# Patient Record
Sex: MALE | Race: NATIVE HAWAIIAN OR OTHER PACIFIC ISLANDER | HISPANIC OR LATINO | Employment: FULL TIME | URBAN - METROPOLITAN AREA
[De-identification: names, ages, dates, MRNs, and addresses within clinical notes are randomized per-mention and may not be internally consistent; named-entity substitution may affect disease eponyms.]

---

## 2024-04-25 ENCOUNTER — HOSPITAL ENCOUNTER (EMERGENCY)
Facility: HOSPITAL | Age: 34
Discharge: OTHER INSTITUTION | End: 2024-04-25
Attending: EMERGENCY MEDICINE
Payer: OTHER MISCELLANEOUS

## 2024-04-25 ENCOUNTER — APPOINTMENT (OUTPATIENT)
Dept: GENERAL RADIOLOGY | Facility: HOSPITAL | Age: 34
End: 2024-04-25
Payer: OTHER MISCELLANEOUS

## 2024-04-25 VITALS
OXYGEN SATURATION: 100 % | HEIGHT: 61 IN | RESPIRATION RATE: 18 BRPM | TEMPERATURE: 97.7 F | BODY MASS INDEX: 32.34 KG/M2 | WEIGHT: 171.3 LBS | HEART RATE: 56 BPM | SYSTOLIC BLOOD PRESSURE: 121 MMHG | DIASTOLIC BLOOD PRESSURE: 82 MMHG

## 2024-04-25 DIAGNOSIS — M79.644 THUMB PAIN, RIGHT: ICD-10-CM

## 2024-04-25 DIAGNOSIS — S61.111A LACERATION OF RIGHT THUMB WITHOUT FOREIGN BODY WITH DAMAGE TO NAIL, INITIAL ENCOUNTER: Primary | ICD-10-CM

## 2024-04-25 PROCEDURE — 90715 TDAP VACCINE 7 YRS/> IM: CPT

## 2024-04-25 PROCEDURE — 73140 X-RAY EXAM OF FINGER(S): CPT

## 2024-04-25 PROCEDURE — 25010000002 LIDOCAINE 1 % SOLUTION

## 2024-04-25 PROCEDURE — 90471 IMMUNIZATION ADMIN: CPT

## 2024-04-25 PROCEDURE — 99285 EMERGENCY DEPT VISIT HI MDM: CPT

## 2024-04-25 PROCEDURE — 25010000002 TETANUS-DIPHTH-ACELL PERTUSSIS 5-2.5-18.5 LF-MCG/0.5 SUSPENSION PREFILLED SYRINGE

## 2024-04-25 RX ORDER — GINSENG 100 MG
1 CAPSULE ORAL ONCE
Status: COMPLETED | OUTPATIENT
Start: 2024-04-25 | End: 2024-04-25

## 2024-04-25 RX ORDER — LIDOCAINE HYDROCHLORIDE 10 MG/ML
10 INJECTION, SOLUTION INFILTRATION; PERINEURAL ONCE
Status: COMPLETED | OUTPATIENT
Start: 2024-04-25 | End: 2024-04-25

## 2024-04-25 RX ORDER — LIDOCAINE 40 MG/G
1 CREAM TOPICAL ONCE
Status: COMPLETED | OUTPATIENT
Start: 2024-04-25 | End: 2024-04-25

## 2024-04-25 RX ORDER — LIDOCAINE 5 G/100G
1 CREAM RECTAL; TOPICAL ONCE
Status: DISCONTINUED | OUTPATIENT
Start: 2024-04-25 | End: 2024-04-25

## 2024-04-25 RX ORDER — HYDROCODONE BITARTRATE AND ACETAMINOPHEN 7.5; 325 MG/1; MG/1
1 TABLET ORAL ONCE
Status: COMPLETED | OUTPATIENT
Start: 2024-04-25 | End: 2024-04-25

## 2024-04-25 RX ADMIN — LIDOCAINE HYDROCHLORIDE 10 ML: 10 INJECTION, SOLUTION INFILTRATION; PERINEURAL at 13:47

## 2024-04-25 RX ADMIN — HYDROCODONE BITARTRATE AND ACETAMINOPHEN 1 TABLET: 7.5; 325 TABLET ORAL at 12:06

## 2024-04-25 RX ADMIN — LIDOCAINE 4% 1 APPLICATION: 4 CREAM TOPICAL at 13:46

## 2024-04-25 RX ADMIN — TETANUS TOXOID, REDUCED DIPHTHERIA TOXOID AND ACELLULAR PERTUSSIS VACCINE, ADSORBED 0.5 ML: 5; 2.5; 8; 8; 2.5 SUSPENSION INTRAMUSCULAR at 13:47

## 2024-04-25 RX ADMIN — BACITRACIN 0.9 G: 500 OINTMENT TOPICAL at 15:07

## 2024-04-25 NOTE — ED PROVIDER NOTES
"Time: 11:51 AM EDT  Date of encounter:  4/25/2024  Room number: 54/54  Independent Historian/Clinical History and Information was obtained by:   Patient    History is limited by: N/A    Chief Complaint: thumb laceration       History of Present Illness:  Patient is a 33 y.o. year old male who presents to the emergency department for right thumb laceration that he sustained while at work. 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     HPI    Patient Care Team  Primary Care Provider: Provider, No Known    Past Medical History:     No Known Allergies  History reviewed. No pertinent past medical history.  History reviewed. No pertinent surgical history.  History reviewed. No pertinent family history.    Home Medications:  Prior to Admission medications    Not on File        Social History:          Review of Systems:  Review of Systems   Constitutional:  Negative for chills and fever.   HENT:  Negative for congestion, ear pain and sore throat.    Eyes:  Negative for pain.   Respiratory:  Negative for cough, chest tightness and shortness of breath.    Cardiovascular:  Negative for chest pain.   Gastrointestinal:  Negative for abdominal pain, diarrhea, nausea and vomiting.   Genitourinary:  Negative for flank pain and hematuria.   Musculoskeletal:  Negative for joint swelling.   Skin:  Positive for wound (right thumb laceration). Negative for pallor.   Neurological:  Negative for seizures and headaches.   All other systems reviewed and are negative.       Physical Exam:  /82 (BP Location: Left arm, Patient Position: Sitting)   Pulse 56   Temp 97.7 °F (36.5 °C) (Oral)   Resp 18   Ht 154.9 cm (61\")   Wt 77.7 kg (171 lb 4.8 oz)   SpO2 100%   BMI 32.37 kg/m²     Physical Exam  Vitals and nursing note reviewed.   Constitutional:       General: He is not in acute distress.     Appearance: Normal appearance. He is not ill-appearing, toxic-appearing or diaphoretic.   HENT:      Head: " Normocephalic and atraumatic.      Mouth/Throat:      Mouth: Mucous membranes are moist.   Eyes:      General: No scleral icterus.  Cardiovascular:      Rate and Rhythm: Normal rate and regular rhythm.      Pulses: Normal pulses.      Heart sounds: Normal heart sounds.   Pulmonary:      Effort: Pulmonary effort is normal. No respiratory distress.      Breath sounds: Normal breath sounds. No stridor. No wheezing.   Abdominal:      General: Abdomen is flat.      Palpations: Abdomen is soft.      Tenderness: There is no abdominal tenderness.   Musculoskeletal:         General: Tenderness and signs of injury present. No swelling or deformity. Normal range of motion.      Cervical back: Normal range of motion and neck supple. No tenderness.      Comments: There are 2 small parallel lacerations on the tip of right thumb.  There is also nailbed involvement.  Please see images.  There is good cap refill to digit PMS intact.     Skin:     General: Skin is warm and dry.      Capillary Refill: Capillary refill takes less than 2 seconds.   Neurological:      Mental Status: He is alert and oriented to person, place, and time. Mental status is at baseline.      Sensory: No sensory deficit.   Psychiatric:         Mood and Affect: Mood normal.         Behavior: Behavior normal.         Thought Content: Thought content normal.         Judgment: Judgment normal.                                Procedures:  Laceration Repair    Date/Time: 4/25/2024 2:54 PM    Performed by: Leah Horner APRN  Authorized by: Edgar Chambers DO    Consent:     Consent obtained:  Verbal    Consent given by:  Patient    Risks, benefits, and alternatives were discussed: yes      Risks discussed:  Infection, pain, poor cosmetic result and poor wound healing  Universal protocol:     Procedure explained and questions answered to patient or proxy's satisfaction: yes      Patient identity confirmed:  Verbally with patient  Anesthesia:     Anesthesia  method:  Topical application and local infiltration    Topical anesthetic:  Lidocaine gel    Local anesthetic:  Lidocaine 1% WITH epi  Laceration details:     Location:  Finger    Finger location:  R thumb    Wound length (cm): 2 parallel cuts to the tip, each measuring 0.5 cm, nail laceration approximately 2.5 cm.  Pre-procedure details:     Preparation:  Patient was prepped and draped in usual sterile fashion  Exploration:     Limited defect created (wound extended): no      Hemostasis achieved with:  Direct pressure    Imaging outcome: foreign body not noted    Treatment:     Area cleansed with:  Povidone-iodine and saline    Amount of cleaning:  Standard    Irrigation solution:  Sterile saline    Irrigation method:  Syringe  Skin repair:     Repair method:  Sutures    Suture size:  4-0    Suture material:  Nylon    Number of sutures:  4  Approximation:     Approximation:  Close  Repair type:     Repair type:  Simple  Post-procedure details:     Dressing:  Antibiotic ointment and non-adherent dressing    Procedure completion:  Tolerated well, no immediate complications        Medical Decision Making:      Comorbidities that affect care:    None    External Notes reviewed:    NoneI have attempted review patient's previous medical encounters however there are none available within the epic charting system.        The following orders were placed and all results were independently analyzed by me:  Orders Placed This Encounter   Procedures    Laceration Repair    XR Finger 2+ View Right    Obtain & Apply The Following- Upper extremity; Finger splint       Medications Given in the Emergency Department:  Medications   HYDROcodone-acetaminophen (NORCO) 7.5-325 MG per tablet 1 tablet (1 tablet Oral Given 4/25/24 1206)   lidocaine (XYLOCAINE) 1 % injection 10 mL (10 mL Infiltration Given 4/25/24 1347)   Tetanus-Diphth-Acell Pertussis (BOOSTRIX) injection 0.5 mL (0.5 mL Intramuscular Given 4/25/24 1347)   lidocaine (LMX) 4  % cream 1 Application (1 Application Topical Given 4/25/24 1346)   bacitracin 500 UNIT/GM ointment 0.9 g (0.9 g Topical Given 4/25/24 1507)        ED Course:    ED Course as of 04/25/24 1829   Thu Apr 25, 2024   1324 Patient continues to wait for numbing medication to be applied.  Nursing staff was consulted about status application. [MS]   1456 Orders placed for hand consult at this time. XRs requested to be power shared.   Pt's is agreeable for hand center to call his employer to schedule follow up if needed.   Pt's employer/friend is [employer]. He can be reached at [phone] [MS]   8438 Due to patient becoming nauseated and lightheaded with norco, he is requesting a less potent pain medication to be prescribed.  [MS]   1512 Spoke with on-call nurse at [facility], Minda, she was also provided pictures of patient's injury.  She advises she will call me back and let me know about follow-up and if patient needs to present to the local tonight. [MS]      ED Course User Index  [MS] Leah Horner, APRN       Labs:    Lab Results (last 24 hours)       ** No results found for the last 24 hours. **             Imaging:    XR Finger 2+ View Right    Result Date: 4/25/2024  XR FINGER 2+ VW RIGHT-  Date of Exam: 4/25/2024 12:05 PM  Indication: finger injury  Comparison: None available.  Findings: No acute fracture is identified. No focal osseous abnormality is identified. The joints appear congruent. There is a soft tissue laceration along the tip of the first digit.      Impression: No acute fracture or traumatic malalignment identified.   Electronically Signed By-Tad Ray MD On:4/25/2024 12:14 PM         Differential Diagnosis and Discussion:    Laceration: Laceration was evaluated for arterial injury, ligamentous damage, and other neurovascular injury.    All X-rays impressions were independently interpreted by me.    MDM               Patient Care Considerations:    I considered  prescribing oral antibiotics however patient is referred to hand specialty center.  Antibiotic selection will be left up to surgeon as well as pain management.      Consultants/Shared Management Plan:    Transfer Provider: I have discussed the case with hand surgeon, Dr. Alvarez (via on call RN) at [facility] who agrees to accept the patient as a transfer.    Social Determinants of Health:    Patient has presented with family members who are responsible, reliable and will ensure follow up care. (Pt presented with employer who will also transporting pt to [facility])       Disposition and Care Coordination:    Transferred: Through independent evaluation of the patient's history, physical, and imperical data, the patient meets criteria to be transferred to another hospital for evaluation/admission.        Final diagnoses:   Laceration of right thumb without foreign body with damage to nail, initial encounter   Thumb pain, right        ED Disposition       ED Disposition   Transfer to Another Facility     Condition   --    Comment   --               This medical record created using voice recognition software.       Leah Horner, APRN  04/25/24 [id number]

## 2024-04-25 NOTE — DISCHARGE INSTRUCTIONS
Please go directly to [facility] to their hand center.  They will be expecting you.  Please do not eat or drink anything until you are seen by other physician.  If it anytime during your travels to the other hospital you become ill develop severe bleeding please pull over and call 911 or report to the nearest emergency department.

## 2024-04-26 RX ORDER — CEPHALEXIN 500 MG/1
500 CAPSULE ORAL 4 TIMES DAILY
Qty: 20 CAPSULE | Refills: 0 | Status: SHIPPED | OUTPATIENT
Start: 2024-04-26 | End: 2024-05-01

## 2024-04-26 RX ORDER — IBUPROFEN 800 MG/1
800 TABLET ORAL EVERY 6 HOURS PRN
Qty: 20 TABLET | Refills: 0 | Status: SHIPPED | OUTPATIENT
Start: 2024-04-26